# Patient Record
Sex: MALE | Race: BLACK OR AFRICAN AMERICAN | NOT HISPANIC OR LATINO | Employment: FULL TIME | URBAN - METROPOLITAN AREA
[De-identification: names, ages, dates, MRNs, and addresses within clinical notes are randomized per-mention and may not be internally consistent; named-entity substitution may affect disease eponyms.]

---

## 2022-07-23 ENCOUNTER — HOSPITAL ENCOUNTER (EMERGENCY)
Facility: CLINIC | Age: 22
Discharge: HOME | End: 2022-07-23
Attending: EMERGENCY MEDICINE | Admitting: EMERGENCY MEDICINE

## 2022-07-23 VITALS
DIASTOLIC BLOOD PRESSURE: 76 MMHG | OXYGEN SATURATION: 99 % | RESPIRATION RATE: 19 BRPM | WEIGHT: 157.19 LBS | SYSTOLIC BLOOD PRESSURE: 132 MMHG | HEART RATE: 62 BPM | TEMPERATURE: 98 F

## 2022-07-23 DIAGNOSIS — R22.0 SWELLING OF UPPER LIP: ICD-10-CM

## 2022-07-23 DIAGNOSIS — T63.451A HORNET STING, ACCIDENTAL OR UNINTENTIONAL, INITIAL ENCOUNTER: ICD-10-CM

## 2022-07-23 PROCEDURE — 99283 EMERGENCY DEPT VISIT LOW MDM: CPT

## 2022-07-23 PROCEDURE — 250N000012 HC RX MED GY IP 250 OP 636 PS 637: Performed by: EMERGENCY MEDICINE

## 2022-07-23 RX ORDER — PREDNISONE 20 MG/1
40 TABLET ORAL DAILY
Qty: 10 TABLET | Refills: 0 | Status: SHIPPED | OUTPATIENT
Start: 2022-07-23 | End: 2022-07-28

## 2022-07-23 RX ORDER — DIPHENHYDRAMINE HCL 25 MG
25 TABLET ORAL EVERY 8 HOURS PRN
Qty: 15 TABLET | Refills: 0 | Status: SHIPPED | OUTPATIENT
Start: 2022-07-23

## 2022-07-23 RX ORDER — PREDNISONE 20 MG/1
40 TABLET ORAL ONCE
Status: COMPLETED | OUTPATIENT
Start: 2022-07-23 | End: 2022-07-23

## 2022-07-23 RX ADMIN — PREDNISONE 40 MG: 20 TABLET ORAL at 13:20

## 2022-07-23 ASSESSMENT — ENCOUNTER SYMPTOMS
HEADACHES: 1
TROUBLE SWALLOWING: 0

## 2022-07-23 NOTE — ED TRIAGE NOTES
Stung by bee x 2 around 1130 to upper lip and nose. Took 50 mg of benadryl at 1146. 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 No known allergies but local swelling noted to sting sites. VSS on RA, airway patent.

## 2022-07-23 NOTE — ED PROVIDER NOTES
History   Chief Complaint:  Insect Bite       The history is provided by the patient.      Godwin Anna is a healthy 21 year old male who presents with an insect bite. 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 He took benadryl at 1146, and was told by coworkers to come to the ED since he had never been stung before. Patient reports a headache, but denies any other symptoms including tongue swelling or difficulty swallowing.     Review of Systems   HENT: Negative for trouble swallowing.    Neurological: Positive for headaches.   All other systems reviewed and are negative.      Allergies:  The patient has no known allergies.     Medications:  No meds.    Past Medical History:     There is no problem list on file for this patient.     Social History:  Presents alone.  Works pest control.     Physical Exam     Patient Vitals for the past 24 hrs:   BP Temp Temp src Pulse Resp SpO2 Weight   07/23/22 1207 -- -- -- -- -- -- 71.3 kg (157 lb 3 oz)   07/23/22 1206 132/76 98  F (36.7  C) Temporal 62 19 99 % --       Physical Exam  General:  No respiratory distress    HENT: Upper lip and bridge of nose are swollen. No tongue swelling.     Cardiovascular: Good cap refill.    Respiratory: Breathing non labored.     Musculoskeletal: No tenderness. No bony deformity.     Skin: No rashes or petechiae    Neurologic: non focal     Psychiatric: Appropriate      Emergency Department Course       Emergency Department Course:       Reviewed:  I reviewed nursing notes, vitals and past medical history    Assessments:  1311 I obtained history and examined the patient as noted above.   1350 I rechecked the patient and explained findings. Prepared for discharge.      Interventions:  1320 Deltasone 40 mg PO     Disposition:  The patient was discharged to home.     Impression & Plan     Medical Decision Making:  The patient was  bitten by a hornet twice on his face.  He has lip swelling which is likely due to local irritation.  There is no tongue swelling no stridor no respiratory distress.  The patient was observed 2 hours post the incident.  I do not think he is allergic.  We started him on prednisone Benadryl and ice and he was told to return if symptoms do worsen or change.    Critical Care Time: was 0 minutes for this patient excluding procedures    Diagnosis:    ICD-10-CM    1. Hornet sting, accidental or unintentional, initial encounter  T63.451A    2. Swelling of upper lip  R22.0        Discharge Medications:  New Prescriptions    DIPHENHYDRAMINE (BENADRYL) 25 MG TABLET    Take 1 tablet (25 mg) by mouth every 8 hours as needed for itching or allergies    PREDNISONE (DELTASONE) 20 MG TABLET    Take 2 tablets (40 mg) by mouth daily for 5 days       Scribe Disclosure:  Neal LOAIZA, am serving as a scribe at 1:11 PM on 7/23/2022 to document services personally performed by Elfego Phillips MD based on my observations and the provider's statements to me.          Elfego Phillips MD  07/23/22 2278